# Patient Record
Sex: MALE | Race: BLACK OR AFRICAN AMERICAN | Employment: PART TIME | URBAN - METROPOLITAN AREA
[De-identification: names, ages, dates, MRNs, and addresses within clinical notes are randomized per-mention and may not be internally consistent; named-entity substitution may affect disease eponyms.]

---

## 2019-04-30 ENCOUNTER — HOSPITAL ENCOUNTER (OUTPATIENT)
Age: 54
Discharge: HOME | End: 2019-04-30
Payer: COMMERCIAL

## 2019-04-30 PROCEDURE — 93005 ELECTROCARDIOGRAM TRACING: CPT | Performed by: FAMILY MEDICINE

## 2019-05-01 LAB
EKG ATRIAL RATE: 77 BPM
EKG DIAGNOSIS: NORMAL
EKG P AXIS: 32 DEGREES
EKG P-R INTERVAL: 164 MS
EKG Q-T INTERVAL: 370 MS
EKG QRS DURATION: 106 MS
EKG QTC CALCULATION (BAZETT): 418 MS
EKG R AXIS: 47 DEGREES
EKG T AXIS: -27 DEGREES
EKG VENTRICULAR RATE: 77 BPM

## 2019-05-01 PROCEDURE — 93010 ELECTROCARDIOGRAM REPORT: CPT | Performed by: INTERNAL MEDICINE

## 2019-05-01 NOTE — PROGRESS NOTES
[facility] [facility]   Cardiovascular Evaluation    PATIENT: Sheela Pavon  DATE: 2019  MRN: [id number]  CSN: [id number]  : 1965      Primary Care Doctor: Sima Oro  Reason for evaluation:   Dizziness      Subjective:   History of present illness on initial date of evaluation:   Sheela Pavon is a 52 y.o. patient who presents referred by Dr. Adama Hansen for cardiac clearance for back surgery (fusion) at [place] [address].  He denies any heart issues in the past. He states he has had \"dizzy\" spells going on x 1 month. He did have an ear infection, but was treated for this. He checks his BP at home and it runs on the higher side. He does smoke. Patient denies chest pain, sob, palpitations, or syncope. Patient Active Problem List   Diagnosis    Occupational injury    Right elbow pain    Lateral epicondylitis  [facility]  10/29/15         Past Medical History:   has a past medical history of Dizzy and Hypertension. Surgical History:   has no past surgical history on file. Social History:   reports that he has been smoking cigarettes. He has been smoking about 0.50 packs per day. He has never used smokeless tobacco. He reports that he drinks alcohol. He reports that he does not use drugs. Family History:  Uncles heart disease above 50  Aunts heart disease above 50     Home Medications:  Reviewed and are listed in nursing record. and/or listed below  Current Outpatient Medications   Medication Sig Dispense Refill    cloNIDine (CATAPRES) 0.1 MG tablet Take 0.1 mg by mouth 2 times daily      Fluticasone Furoate 50 MCG/ACT AEPB Inhale into the lungs      HYDROcodone-acetaminophen (NORCO) 5-325 MG per tablet Take 1 tablet by mouth every 6 hours as needed for Pain.       lisinopril (PRINIVIL;ZESTRIL) 30 MG tablet Take 30 mg by mouth daily      nabumetone (RELAFEN) 750 MG tablet Take 750 mg by mouth 2 times daily      tiZANidine (ZANAFLEX) 4 MG tablet Take 4 mg by mouth every 8 hours results found for: LABVLDL      CARDIAC DATA   EK2019 NSR with IVCD, NSST changes    ECHO/MUGA:    STRESS TEST: 18 ()  Treadmill exercise was performed according to the Brayan protocol for 7 minutes and 33 seconds to a blood pressure of 224/120 and 87% MPHR. The patient received an intravenous injection of 72.7 millicuries of FD-91M tetrofosmin and cardiac SPECT imaging was performed 30 minutes following injection given at 0955. The patient experienced shortness of breath and left leg pain but no chest pain. Symptoms resolved with rest   ECG DATA:  Rest ECG:  Sinus bradycardia/Normal conduction/No arrhythmias/Normal repolarization  Stress ECG:  Sinus tachycardia with occasional atrial premature complex and nonspecific ST segment changes. Was less than 0.5 mm ST segment depression. Exaggerated hypertensive response to exercise. The Duke treadmill score = 5   ECG findings (only): Normal exercise ECG. Exaggerated hypertensive response to exercise. CARDIAC CATH:    VASCULAR/OTHER IMAGING:      Assessment and Plan   Bhavin Parker is a 52 y.o. male who presents today for the following problems:      Referral made for \"eval and Tc\" but not sure question from PCP, pt states is preop      1. Preop Spinal fusion, hx of spinal stenosis  2. HTN:  3. Hypertensive response to exercise  4. Murmur    MD Plan:  1. Pt recent stress test without ischemia, no CP so ok to proceed as low risk  2. I do suggest aggressive Bp management but will leave to PCP, given upcoming surgery will increase lisinopril to 40mg po qday  3. Echo for murmur and LVH  4. Pt to f/u if echo abnormal      CC: Dr. Yelena Rowland      Patient Active Problem List   Diagnosis    Occupational injury    Right elbow pain    Lateral epicondylitis  [facility]  10/29/15       Patient Plan:  1. Echocardiogram  2. Increase the Lisinopril to 40 mg   3. Goal BP would be 130/80 or less  4. May proceed with surgery  5.  We will call you with the results of the Echo        It is a pleasure to assist in the care of Nay Rg. Please call with any questions. This note was scribed in the presence of Dr. Lucy Joel MD by Gustavo Nguyễn RN. The scribes documentation has been prepared under my direction and personally reviewed by me in its entirety. I confirm that the note above accurately reflects all work, treatment, procedures, and medical decision making performed by me. I, Dr. Yohana Villalobos MD, personally performed the services described in this documentation as scribed by Alfred Avilez in my presence, and it is both accurate and complete to the best of our ability.            Yohana Villalobos MD, [address] [facility] Cardiologist  [facility]  [phone] [facility]  [phone] [address] [place] [address]

## 2019-05-02 ENCOUNTER — OFFICE VISIT (OUTPATIENT)
Dept: CARDIOLOGY CLINIC | Age: 54
End: 2019-05-02
Payer: COMMERCIAL

## 2019-05-02 VITALS
SYSTOLIC BLOOD PRESSURE: 164 MMHG | HEIGHT: 68 IN | OXYGEN SATURATION: 98 % | HEART RATE: 74 BPM | BODY MASS INDEX: 34.74 KG/M2 | DIASTOLIC BLOOD PRESSURE: 88 MMHG | WEIGHT: 229.2 LBS

## 2019-05-02 DIAGNOSIS — I10 ESSENTIAL HYPERTENSION: ICD-10-CM

## 2019-05-02 DIAGNOSIS — Z01.810 PREOP CARDIOVASCULAR EXAM: Primary | ICD-10-CM

## 2019-05-02 DIAGNOSIS — R01.1 MURMUR: ICD-10-CM

## 2019-05-02 PROCEDURE — 4004F PT TOBACCO SCREEN RCVD TLK: CPT | Performed by: INTERNAL MEDICINE

## 2019-05-02 PROCEDURE — 3017F COLORECTAL CA SCREEN DOC REV: CPT | Performed by: INTERNAL MEDICINE

## 2019-05-02 PROCEDURE — G8427 DOCREV CUR MEDS BY ELIG CLIN: HCPCS | Performed by: INTERNAL MEDICINE

## 2019-05-02 PROCEDURE — 99204 OFFICE O/P NEW MOD 45 MIN: CPT | Performed by: INTERNAL MEDICINE

## 2019-05-02 PROCEDURE — G8417 CALC BMI ABV UP PARAM F/U: HCPCS | Performed by: INTERNAL MEDICINE

## 2019-05-02 RX ORDER — LISINOPRIL 30 MG/1
30 TABLET ORAL DAILY
COMMUNITY
End: 2019-05-02

## 2019-05-02 RX ORDER — NABUMETONE 750 MG/1
750 TABLET, FILM COATED ORAL 2 TIMES DAILY
COMMUNITY

## 2019-05-02 RX ORDER — TIZANIDINE 4 MG/1
4 TABLET ORAL EVERY 8 HOURS PRN
COMMUNITY

## 2019-05-02 RX ORDER — HYDROCODONE BITARTRATE AND ACETAMINOPHEN 5; 325 MG/1; MG/1
1 TABLET ORAL EVERY 6 HOURS PRN
COMMUNITY

## 2019-05-02 RX ORDER — LISINOPRIL 40 MG/1
40 TABLET ORAL DAILY
Qty: 30 TABLET | Refills: 11
Start: 2019-05-02 | End: 2019-05-03 | Stop reason: SDUPTHER

## 2019-05-02 RX ORDER — CLONIDINE HYDROCHLORIDE 0.1 MG/1
0.1 TABLET ORAL 2 TIMES DAILY
COMMUNITY

## 2019-05-02 NOTE — LETTER
[address] [place] [address] - [address] [place] [address]  Phone: [phone]  Fax: [phone]    Arun Hamm MD        May 2, 2019     Lo Yang 1965    Patient may proceed with non cardiac surgery at low cardiac risk. If you have any questions or concerns, please don't hesitate to call.     Sincerely,        Arun Hamm MD

## 2019-05-02 NOTE — LETTER
[facility]   Cardiovascular Evaluation    PATIENT: Vince Rivas  DATE: 2019  MRN: [id number]  CSN: [id number]  : 1965      Primary Care Doctor: Sindy Mcadams  Reason for evaluation:   Dizziness      Subjective:   History of present illness on initial date of evaluation:   Vince Rivas is a 52 y.o. patient who presents referred by Dr. Elle Torres for cardiac clearance for back surgery (fusion) at [place] [address].  He denies any heart issues in the past. He states he has had \"dizzy\" spells going on x 1 month. He did have an ear infection, but was treated for this. He checks his BP at home and it runs on the higher side. He does smoke. Patient denies chest pain, sob, palpitations, or syncope. Patient Active Problem List   Diagnosis    Occupational injury    Right elbow pain    Lateral epicondylitis  [facility]  10/29/15         Past Medical History:   has a past medical history of Dizzy and Hypertension. Surgical History:   has no past surgical history on file. Social History:   reports that he has been smoking cigarettes. He has been smoking about 0.50 packs per day. He has never used smokeless tobacco. He reports that he drinks alcohol. He reports that he does not use drugs. Family History:  Uncles heart disease above 50  Aunts heart disease above 50     Home Medications:  Reviewed and are listed in nursing record. and/or listed below  Current Outpatient Medications   Medication Sig Dispense Refill    cloNIDine (CATAPRES) 0.1 MG tablet Take 0.1 mg by mouth 2 times daily      Fluticasone Furoate 50 MCG/ACT AEPB Inhale into the lungs      HYDROcodone-acetaminophen (NORCO) 5-325 MG per tablet Take 1 tablet by mouth every 6 hours as needed for Pain.       lisinopril (PRINIVIL;ZESTRIL) 30 MG tablet Take 30 mg by mouth daily      nabumetone (RELAFEN) 750 MG tablet Take 750 mg by mouth 2 times daily  tiZANidine (ZANAFLEX) 4 MG tablet Take 4 mg by mouth every 8 hours as needed      amLODIPine (NORVASC) 10 MG tablet Take 10 mg by mouth daily        No current facility-administered medications for this visit. Allergies:  Patient has no known allergies. Review of Systems:   A 14 point review of symptoms completed. Pertinent positives identified in the HPI, all other review of symptoms negative as below.     Objective:   PHYSICAL EXAM:    Vitals:    05/02/19 1047   BP: (!) 164/88   Pulse: 74   SpO2: 98%    Weight: 229 lb 3.2 oz (104 kg)     Wt Readings from Last 3 Encounters:   05/02/19 229 lb 3.2 oz (104 kg)   01/12/16 210 lb (95.3 kg)   11/20/15 210 lb (95.3 kg)         General Appearance:  Alert, cooperative, no distress, appears stated age   Head:  Normocephalic, atraumatic   Eyes:  PERRL, conjunctiva/corneas clear   Nose: Nares normal, no drainage or sinus tenderness   Throat: Lips, mucosa, and tongue normal   Neck: Supple, symmetrical, trachea midline, NL thyroid no carotid bruit or JVD   Lungs:   CTAB, respirations unlabored   Chest Wall:  No tenderness or deformity   Heart:  Regular rhythm and normal rate; S1, S2 are normal;   n no rub or gallop 1/6 systolic in LUSB   Abdomen:   Soft, non-tender, +BS x 4, no masses, no organomegaly   Extremities: Extremities normal, atraumatic, no cyanosis or edema   Pulses: 2+ and symmetric   Skin: Skin color, texture, turgor normal, no rashes or lesions   Pysch: Normal mood and affect   Neurologic: Normal gross motor and sensory exam.         LABS   CBC:    No results found for: WBC, RBC, HGB, HCT, MCV, RDW, PLT  CMP:  No results found for: NA, K, CL, CO2, BUN, CREATININE, GFRAA, AGRATIO, LABGLOM, GLUCOSE, PROT, CALCIUM, BILITOT, ALKPHOS, AST, ALT  PT/INR:   No results found for: PTINR  Liver:  No components found for: CHLPL  No results found for: ALT, AST, GGT, ALKPHOS, BILITOT  No results found for: LABA1C  Lipids:       No results found for: TRIG No results found for: HDL       No results found for: LDLCALC       No results found for: LABVLDL      CARDIAC DATA   EK2019 NSR with IVCD, NSST changes    ECHO/MUGA:    STRESS TEST: 18 ()  Treadmill exercise was performed according to the Brayan protocol for 7 minutes and 33 seconds to a blood pressure of 224/120 and 87% MPHR. The patient received an intravenous injection of 47.2 millicuries of HELDER-86Z tetrofosmin and cardiac SPECT imaging was performed 30 minutes following injection given at 0955. The patient experienced shortness of breath and left leg pain but no chest pain. Symptoms resolved with rest   ECG DATA:  Rest ECG:  Sinus bradycardia/Normal conduction/No arrhythmias/Normal repolarization  Stress ECG:  Sinus tachycardia with occasional atrial premature complex and nonspecific ST segment changes. Was less than 0.5 mm ST segment depression. Exaggerated hypertensive response to exercise. The Duke treadmill score = 5   ECG findings (only): Normal exercise ECG. Exaggerated hypertensive response to exercise. CARDIAC CATH:    VASCULAR/OTHER IMAGING:      Assessment and Plan   Latha Wilson is a 52 y.o. male who presents today for the following problems:      Referral made for \"eval and Tc\" but not sure question from PCP, pt states is preop      1. Preop Spinal fusion, hx of spinal stenosis  2. HTN:  3. Hypertensive response to exercise  4. Murmur    MD Plan:  1. Pt recent stress test without ischemia, no CP so ok to proceed as low risk  2. I do suggest aggressive Bp management but will leave to PCP, given upcoming surgery will increase lisinopril to 40mg po qday  3. Echo for murmur and LVH  4. Pt to f/u if echo abnormal      CC: Dr. Keyur Vazquez      Patient Active Problem List   Diagnosis    Occupational injury    Right elbow pain    Lateral epicondylitis  [facility]  10/29/15       Patient Plan:  1. Echocardiogram  2. Increase the Lisinopril to 40 mg   3.  Goal BP would be 130/80 or less 4. May proceed with surgery  5. We will call you with the results of the Echo        It is a pleasure to assist in the care of Bobo Handley. Please call with any questions. This note was scribed in the presence of Dr. Jasmeet Morales MD by Kristina Jones RN. The scribes documentation has been prepared under my direction and personally reviewed by me in its entirety. I confirm that the note above accurately reflects all work, treatment, procedures, and medical decision making performed by me. I, Dr. Carina Morse MD, personally performed the services described in this documentation as scribed by Erika Hamilton in my presence, and it is both accurate and complete to the best of our ability.            Carina Morse MD, [facility] Cardiologist  [facility]  [phone] [facility]  [phone] [address] [place] [address]

## 2019-05-03 RX ORDER — LISINOPRIL 40 MG/1
40 TABLET ORAL DAILY
Qty: 30 TABLET | Refills: 11 | Status: SHIPPED | OUTPATIENT
Start: 2019-05-03